# Patient Record
Sex: FEMALE | Race: WHITE | NOT HISPANIC OR LATINO | URBAN - METROPOLITAN AREA
[De-identification: names, ages, dates, MRNs, and addresses within clinical notes are randomized per-mention and may not be internally consistent; named-entity substitution may affect disease eponyms.]

---

## 2018-05-17 DIAGNOSIS — Z85.3 HISTORY OF LEFT BREAST CANCER: Primary | ICD-10-CM

## 2018-05-22 ENCOUNTER — HOSPITAL ENCOUNTER (OUTPATIENT)
Dept: RADIOLOGY | Facility: HOSPITAL | Age: 63
Discharge: HOME | End: 2018-05-22
Attending: SURGERY
Payer: COMMERCIAL

## 2018-05-22 DIAGNOSIS — Z85.3 HISTORY OF LEFT BREAST CANCER: ICD-10-CM

## 2018-05-22 PROCEDURE — 77066 DX MAMMO INCL CAD BI: CPT

## 2018-08-22 ENCOUNTER — OFFICE VISIT (OUTPATIENT)
Dept: GYNECOLOGY | Facility: CLINIC | Age: 63
End: 2018-08-22
Payer: COMMERCIAL

## 2018-08-22 VITALS — WEIGHT: 239 LBS

## 2018-08-22 DIAGNOSIS — Z01.419 ENCOUNTER FOR GYNECOLOGICAL EXAMINATION WITHOUT ABNORMAL FINDING: Primary | ICD-10-CM

## 2018-08-22 DIAGNOSIS — Z85.3 HISTORY OF BREAST CANCER: ICD-10-CM

## 2018-08-22 DIAGNOSIS — N95.0 POSTMENOPAUSAL BLEEDING: ICD-10-CM

## 2018-08-22 PROBLEM — I26.99 PULMONARY EMBOLISM (CMS/HCC): Status: ACTIVE | Noted: 2017-05-18

## 2018-08-22 PROCEDURE — 87624 HPV HI-RISK TYP POOLED RSLT: CPT | Performed by: OBSTETRICS & GYNECOLOGY

## 2018-08-22 PROCEDURE — 88175 CYTOPATH C/V AUTO FLUID REDO: CPT | Performed by: OBSTETRICS & GYNECOLOGY

## 2018-08-22 PROCEDURE — S0612 ANNUAL GYNECOLOGICAL EXAMINA: HCPCS | Performed by: OBSTETRICS & GYNECOLOGY

## 2018-08-22 RX ORDER — LISINOPRIL 10 MG/1
10 TABLET ORAL EVERY MORNING
COMMUNITY
End: 2019-07-16

## 2018-08-22 NOTE — PROGRESS NOTES
"Chief Complaint:  Annual  Here for annual and problem. Last seen 12/15. S/p breast ca x 2. S/p PE from Tamoxifen now on Xarelto, followed by Dr Cowan. Genetic testing negative.   Needs colonoscopy. Sexually active, one male partner, no issues, using lubricant. Pap neg/neg . Hx of abn paps in past, rpeats all nl.    Sent by PCP for recent vaginal bleeding x one episode \" happened after strenuous coughing\" No prior episodes. Now resolved. No pain. Had been on tamoxifen total of 18 mos before d/c in 3/17    HPI:  2018      Jocelin Chopra is a 63 y.o. female who presents for annual exam. The patient has no complaints today. The patient is sexually active. GYN screening history: last pap: was normal. The patient is not taking hormone replacement therapy. Patient reports post-menopausal vaginal bleeding.. The patient participates in regular exercise: no.  The patient reports that there is not domestic violence in her life.    History of abnormal Pap smear: yes - see above  Family history of uterine or ovarian cancer: no  History of abnormal mammogram: yes - breast ca  Family history of breast cancer: no    OB History: Menstrual History:  OB History      Para Term  AB Living    5 2 2   3 2    SAB TAB Ectopic Multiple Live Births    2       2         No LMP recorded.         Medical History:   Past Medical History:   Diagnosis Date   • Breast cancer (CMS/HCC) (HCC)      x 2, had lumpectomy and internal radiation, then arimidex couldn't tolerate five yrs later had second primary same breast ( rt)  had lumpectomy, radiation, tamoxifen   • Herpes    • History of pulmonary embolus (PE) 2017    now on xarelto, thought to be due to tamoxifen   • Hypertension        Surgical History:   Past Surgical History:   Procedure Laterality Date   • BREAST LUMPECTOMY      x2   • WRIST FRACTURE SURGERY         Social History:   Social History     Social History   • Marital status:      Spouse name: N/A   • " Number of children: N/A   • Years of education: N/A     Social History Main Topics   • Smoking status: Never Smoker   • Smokeless tobacco: Never Used   • Alcohol use Yes   • Drug use: Unknown   • Sexual activity: Not Asked     Other Topics Concern   • None     Social History Narrative   • None       Family History:   Family History   Problem Relation Age of Onset   • Pulmonary embolism Paternal Grandmother    • Diabetes Maternal Grandmother    • Colon cancer Father    • Stroke Mother    • Raynaud syndrome Sister        Current Medications:   Current Outpatient Prescriptions   Medication Sig Dispense Refill   • lisinopril (PRINIVIL) 10 mg tablet Take 10 mg by mouth daily.     • rivaroxaban (XARELTO) 10 mg tablet Take 1 tablet by mouth daily.       No current facility-administered medications for this visit.        Allergies: Codeine and Amoxicillin    Review of Systems  Constitutional: negative  Gastrointestinal: negative for abdominal pain  Genitourinary:negative for urinary incontinence  Reproductive:she complains of see hpi  Integument/breast: negative for breast lump, breast tenderness and nipple discharge  Musculoskeletal:negative  Neurological: negative  Behavioral/Psych: negative for anxiety and depression  Endocrine: negative    Physical Exam  Wt 108 kg (239 lb)      General Appearance: Alert, cooperative, no acute distress, obese  Head: Normocephalic, without obvious abnormality, atraumatic    Neck: no adenopathy  Thyroid: no enlargement/tenderness/nodules  Lungs: Clear to auscultation bilaterally, respirations unlabored  Heart: Regular rate and rhythm, S1 and S2 normal, no murmur, rub or gallop  Breast: radiationn changes and scars rt, no masses, nontender and no discharge  Abdomen: Soft, nontender, nondistended,   no masses, no organomegaly  Back: kyphosisNo   Pelvic:     Vulva normal  Vagina normal mucosa  Cervixmultiparous appearance  Uterusnormal size, anteverted  Adnexa: can't assess secondary to  habitus  Rectal: mass No   Extremities: wnl  Musculoskeletal:wnl  Skin: Skin color, texture, turgor normal, no rashes or lesions  Lymph nodes: supraclavicular and axillary nodes normal  Neurologic:oriented and  memory intact  Psych:normal affect and behavior appropriate    Assessment & Plan    Postmenopausal bleeding, pap w/hpv done, needs endo bx and US. Has risk of endo ca because of tamoxifen use.    Dexa age 64    Return in about 2 weeks (around 9/5/2018) for endo bx.  Bailey Silver MD

## 2018-08-29 LAB
CASE RPRT: NORMAL
CLINICAL INFO: NORMAL
CLINICAL INFO: NORMAL
HPV HR 12 DNA CVX QL NAA+PROBE: NEGATIVE
HPV16 DNA SPEC QL NAA+PROBE: NEGATIVE
HPV18 DNA SPEC QL NAA+PROBE: NEGATIVE
SPECIMEN PROCESSING COMMENT: NORMAL
THIN PREP CVX: NORMAL

## 2018-09-01 ENCOUNTER — HOSPITAL ENCOUNTER (OUTPATIENT)
Dept: RADIOLOGY | Age: 63
Discharge: HOME | End: 2018-09-01
Attending: OBSTETRICS & GYNECOLOGY
Payer: COMMERCIAL

## 2018-09-01 DIAGNOSIS — N95.0 POSTMENOPAUSAL BLEEDING: ICD-10-CM

## 2018-09-01 PROCEDURE — 76830 TRANSVAGINAL US NON-OB: CPT

## 2018-09-05 ENCOUNTER — TELEPHONE (OUTPATIENT)
Dept: GYNECOLOGY | Facility: CLINIC | Age: 63
End: 2018-09-05

## 2018-09-07 ENCOUNTER — TELEPHONE (OUTPATIENT)
Dept: GYNECOLOGY | Facility: CLINIC | Age: 63
End: 2018-09-07

## 2018-09-07 NOTE — TELEPHONE ENCOUNTER
PT HAVING ENDO BIOPSY ON TUESDAY, DOES NOT UNDERSTAND THE INSTRUCTION SHEET SHE WAS GIVEN.  PLS CALL [phone]

## 2018-09-07 NOTE — TELEPHONE ENCOUNTER
Needs d/c and hysteroscopy. Stripe 20 mm.  Will need to stop xarelto. Pt to talk to her pcp.  Reviewed pap results as well, nl.

## 2018-09-24 ENCOUNTER — TELEPHONE (OUTPATIENT)
Dept: GYNECOLOGY | Facility: CLINIC | Age: 63
End: 2018-09-24

## 2018-09-24 NOTE — TELEPHONE ENCOUNTER
Has stripe 9 - 20 mm. Don't think biopsy would be sufficient.  Will be done under MAC    Still needs med clearnce

## 2018-09-24 NOTE — TELEPHONE ENCOUNTER
Pt called would like a call back to discuss her possible having a D&C w/o bx Pt says she have some concerns Pt can be reached at [phone]

## 2018-10-01 ENCOUNTER — TELEPHONE (OUTPATIENT)
Dept: GYNECOLOGY | Facility: CLINIC | Age: 63
End: 2018-10-01

## 2018-10-25 ENCOUNTER — PREP FOR CASE (OUTPATIENT)
Dept: GYNECOLOGY | Facility: CLINIC | Age: 63
End: 2018-10-25

## 2018-10-25 DIAGNOSIS — R93.89 ENDOMETRIAL THICKENING ON ULTRASOUND: ICD-10-CM

## 2018-10-25 DIAGNOSIS — N95.0 POSTMENOPAUSAL BLEEDING: Primary | ICD-10-CM

## 2018-10-25 RX ORDER — SODIUM CHLORIDE 9 MG/ML
INJECTION, SOLUTION INTRAVENOUS CONTINUOUS
Status: CANCELLED | OUTPATIENT
Start: 2018-11-08 | End: 2018-11-09

## 2018-10-26 ENCOUNTER — APPOINTMENT (OUTPATIENT)
Dept: PREADMISSION TESTING | Facility: HOSPITAL | Age: 63
End: 2018-10-26
Attending: OBSTETRICS & GYNECOLOGY
Payer: COMMERCIAL

## 2018-10-26 VITALS
HEART RATE: 89 BPM | WEIGHT: 240.1 LBS | OXYGEN SATURATION: 98 % | TEMPERATURE: 98.8 F | BODY MASS INDEX: 40 KG/M2 | RESPIRATION RATE: 16 BRPM | HEIGHT: 65 IN | DIASTOLIC BLOOD PRESSURE: 85 MMHG | SYSTOLIC BLOOD PRESSURE: 162 MMHG

## 2018-10-26 DIAGNOSIS — Z01.818 ENCOUNTER FOR PREADMISSION TESTING: Primary | ICD-10-CM

## 2018-10-26 DIAGNOSIS — N95.0 POSTMENOPAUSAL BLEEDING: ICD-10-CM

## 2018-10-26 DIAGNOSIS — R93.89 ENDOMETRIAL THICKENING ON ULTRASOUND: ICD-10-CM

## 2018-10-26 LAB
ABO + RH BLD: NORMAL
ALBUMIN SERPL-MCNC: 3.8 G/DL (ref 3.4–5)
ALP SERPL-CCNC: 95 IU/L (ref 35–126)
ALT SERPL-CCNC: 25 IU/L (ref 11–54)
ANION GAP SERPL CALC-SCNC: 9 MEQ/L (ref 3–15)
APTT PPP: 35 SEC (ref 23–35)
AST SERPL-CCNC: 18 IU/L (ref 15–41)
ATRIAL RATE: 80
BASOPHILS # BLD: 0.04 K/UL (ref 0.01–0.1)
BASOPHILS NFR BLD: 0.7 %
BILIRUB SERPL-MCNC: 0.6 MG/DL (ref 0.3–1.2)
BLD GP AB SCN SERPL QL: NEGATIVE
BLOOD BANK CMNT PATIENT-IMP: NORMAL
BUN SERPL-MCNC: 12 MG/DL (ref 8–20)
CALCIUM SERPL-MCNC: 9.2 MG/DL (ref 8.9–10.3)
CHLORIDE SERPL-SCNC: 105 MEQ/L (ref 98–109)
CO2 SERPL-SCNC: 25 MEQ/L (ref 22–32)
CREAT SERPL-MCNC: 0.8 MG/DL (ref 0.6–1.1)
D AG BLD QL: POSITIVE
DIFFERENTIAL METHOD BLD: NORMAL
EOSINOPHIL # BLD: 0.13 K/UL (ref 0.04–0.36)
EOSINOPHIL NFR BLD: 2.1 %
ERYTHROCYTE [DISTWIDTH] IN BLOOD BY AUTOMATED COUNT: 12.8 % (ref 11.7–14.4)
GFR SERPL CREATININE-BSD FRML MDRD: >60 ML/MIN/1.73M*2
GLUCOSE SERPL-MCNC: 89 MG/DL (ref 70–99)
HCT VFR BLDCO AUTO: 45.2 % (ref 35–45)
HGB BLD-MCNC: 14.4 G/DL (ref 11.8–15.7)
IMM GRANULOCYTES # BLD AUTO: 0.02 K/UL (ref 0–0.08)
IMM GRANULOCYTES NFR BLD AUTO: 0.3 %
INR PPP: 1.5 INR
LABORATORY COMMENT REPORT: NORMAL
LYMPHOCYTES # BLD: 2.09 K/UL (ref 1.2–3.5)
LYMPHOCYTES NFR BLD: 34.4 %
MCH RBC QN AUTO: 29.3 PG (ref 28–33.2)
MCHC RBC AUTO-ENTMCNC: 31.9 G/DL (ref 32.2–35.5)
MCV RBC AUTO: 92.1 FL (ref 83–98)
MONOCYTES # BLD: 0.43 K/UL (ref 0.28–0.8)
MONOCYTES NFR BLD: 7.1 %
NEUTROPHILS # BLD: 3.36 K/UL (ref 1.7–7)
NEUTS SEG NFR BLD: 55.4 %
NRBC BLD-RTO: 0 %
P AXIS: 63
PDW BLD AUTO: 11.2 FL (ref 9.4–12.3)
PLATELET # BLD AUTO: 241 K/UL (ref 150–369)
POTASSIUM SERPL-SCNC: 4.9 MEQ/L (ref 3.6–5.1)
PR INTERVAL: 164
PROT SERPL-MCNC: 6.4 G/DL (ref 6–8.2)
PROTHROMBIN TIME: 18.6 SEC (ref 12.2–14.5)
QRS DURATION: 82
QT INTERVAL: 368
QTC CALCULATION(BAZETT): 424
R AXIS: 31
RBC # BLD AUTO: 4.91 M/UL (ref 3.93–5.22)
SODIUM SERPL-SCNC: 139 MEQ/L (ref 136–144)
T WAVE AXIS: 28
VENTRICULAR RATE: 80
WBC # BLD AUTO: 6.07 K/UL (ref 3.8–10.5)

## 2018-10-26 PROCEDURE — 93010 ELECTROCARDIOGRAM REPORT: CPT | Performed by: INTERNAL MEDICINE

## 2018-10-26 PROCEDURE — 85610 PROTHROMBIN TIME: CPT

## 2018-10-26 PROCEDURE — 80053 COMPREHEN METABOLIC PANEL: CPT

## 2018-10-26 PROCEDURE — 93005 ELECTROCARDIOGRAM TRACING: CPT

## 2018-10-26 PROCEDURE — 36415 COLL VENOUS BLD VENIPUNCTURE: CPT

## 2018-10-26 PROCEDURE — 86901 BLOOD TYPING SEROLOGIC RH(D): CPT

## 2018-10-26 PROCEDURE — 85025 COMPLETE CBC W/AUTO DIFF WBC: CPT

## 2018-10-26 PROCEDURE — 85730 THROMBOPLASTIN TIME PARTIAL: CPT

## 2018-10-26 ASSESSMENT — PAIN SCALES - GENERAL: PAINLEVEL: 0-NO PAIN

## 2018-10-26 NOTE — PRE-PROCEDURE INSTRUCTIONS
1. We will call you between 3 pm and 7 pm on November 7, 2018 to determine that arrival time for your procedure. If you do not hear by 6PM. Please call [phone] for arrival time.    2. Please report to [facility] [place], walk into [facility] and report to the admission desk on first floor on the day of your procedure.   3. Please follow the following fasting guidelines:   Nothing to eat or drink after midnight unless otherwise instructed by  your physician.    4. Early on the morning of the procedure please take your usual dose of the listed medications with a sip of water:    none   5. Other Instructions:  Bring list of medications, follow all instructions per md     6. If you develop a cold, cough, fever, rash, or other symptom prior to the data of the procedure, please report it to your physician immediately.   7. If you need to cancel the procedure for any reason, please contact your physician or call the unit listed above.   8. Make arrangements to have someone drive you home from the procedure. If you have not arranged for transportation home, your surgery may be cancelled.    9. You may not take public transportation unless accompanied by a responsible person.   10. You may not drive a car or operate complex or potentially dangerous machinery for 24 hours following anesthesia and/or sedation.   11. If it is medically necessary for you to have a longer stay, you will be informed as soon as the decision is made.   12. Do not wear or being anything of value to the hospital including jewelry of any kind. Do not wear make-up or contact lenses. DO bring your glasses and hearing aid.   13. No lotion, creams, powders, or oils on skin the morning of procedure    14. Dress in comfortable clothes.   15.  If instructed, please bring a copy of your Advanced Directive (Living Will/Durable Power of ) on the day of your procedure.      Pre operative instructions given as per protocol.  Form explained by:  Sarah Maynard RN     I have read and understand the above information. I have had sufficient opportunity to ask questions I might have and they have been answered to my satisfaction. I agree to comply with the Patient Responsibilities listed above and have received a copy of this form.

## 2018-11-05 ENCOUNTER — OFFICE VISIT (OUTPATIENT)
Dept: GYNECOLOGY | Facility: CLINIC | Age: 63
End: 2018-11-05
Payer: COMMERCIAL

## 2018-11-05 VITALS
SYSTOLIC BLOOD PRESSURE: 132 MMHG | HEIGHT: 65 IN | WEIGHT: 241.2 LBS | DIASTOLIC BLOOD PRESSURE: 90 MMHG | BODY MASS INDEX: 40.19 KG/M2

## 2018-11-05 DIAGNOSIS — R93.89 ENDOMETRIAL THICKENING ON ULTRASOUND: ICD-10-CM

## 2018-11-05 DIAGNOSIS — N95.0 POSTMENOPAUSAL BLEEDING: Primary | ICD-10-CM

## 2018-11-05 PROBLEM — I10 HTN (HYPERTENSION): Status: ACTIVE | Noted: 2018-10-15

## 2018-11-05 PROCEDURE — 99214 OFFICE O/P EST MOD 30 MIN: CPT | Performed by: OBSTETRICS & GYNECOLOGY

## 2018-11-05 RX ORDER — MISOPROSTOL 200 UG/1
TABLET ORAL
Qty: 4 TABLET | Refills: 0 | Status: SHIPPED | OUTPATIENT
Start: 2018-11-05 | End: 2018-11-08 | Stop reason: HOSPADM

## 2018-11-05 NOTE — PROGRESS NOTES
Pre op visit:      CC:    Postmenopausal bleeding with thickened stripe. Of 9 mm but up to 20 mm. HX of tamoxifen. No current bleeding. Bleeding occurred x 1 in August For d&c hysteroscopy with truclear. Pt has read hysteroscopy literature.    Saw her pcp, has hx of PE, told by PCP to d/c xarelto today and wait until she has no post op bleeding.    HPI:    Has above. Pt had second opinion who concurs. US done there also with abn stripe.    Med history:   Past Medical History:   Diagnosis Date   • Breast cancer (CMS/HCC) (HCC)      x 2, had lumpectomy and internal radiation, then arimidex couldn't tolerate five yrs later had second primary same breast ( rt)  had lumpectomy, radiation, tamoxifen   • Herpes    • History of pulmonary embolus (PE) 2017    now on xarelto, thought to be due to tamoxifen   • Hypertension    • PONV (postoperative nausea and vomiting)              Surg hx:   Past Surgical History:   Procedure Laterality Date   • BREAST LUMPECTOMY      x2   • COLONOSCOPY     • WRIST FRACTURE SURGERY           ROS:    Reproductive:  Postmenopausal bleeding    Gi: neg    Gu: neg      Physical;    Constitutional: obese    COR RRR    RESP: clear  To auscultation bilaterally    Pelvic: atrophic cervix, uterus ant nl size  Adnexa not palpable secondary to wgt    Neuro; aaox3    Psych: appropriate mood and affect.    Assessment/Plan:  Problem List Items Addressed This Visit     Postmenopausal bleeding - Primary    Overview     For d&c hysteroscopy with Truclear for thickened stripe         Endometrial thickening on ultrasound    Overview     Added automatically from request for surgery [id number]  Stripe up to 20 mm.           Consent and procedure reviewed including risks of bleeding, infection, failed procedure, perforation of uterus bladder or bowel.  Pt to use misoprostol vaginally pre op.  Greater than 50% of the visit was spent in face to face discussion.    Return in about 3 weeks (around 11/26/2018) for post  op.      Bailey Silver MD

## 2018-11-06 ENCOUNTER — TELEPHONE (OUTPATIENT)
Dept: GYNECOLOGY | Facility: CLINIC | Age: 63
End: 2018-11-06

## 2018-11-08 ENCOUNTER — ANESTHESIA EVENT (OUTPATIENT)
Dept: SURGERY | Facility: HOSPITAL | Age: 63
Setting detail: HOSPITAL OUTPATIENT SURGERY
End: 2018-11-08
Payer: COMMERCIAL

## 2018-11-08 ENCOUNTER — HOSPITAL ENCOUNTER (OUTPATIENT)
Facility: HOSPITAL | Age: 63
Setting detail: HOSPITAL OUTPATIENT SURGERY
Discharge: HOME | End: 2018-11-08
Attending: OBSTETRICS & GYNECOLOGY | Admitting: OBSTETRICS & GYNECOLOGY
Payer: COMMERCIAL

## 2018-11-08 VITALS
OXYGEN SATURATION: 100 % | SYSTOLIC BLOOD PRESSURE: 132 MMHG | TEMPERATURE: 98.5 F | WEIGHT: 237 LBS | HEIGHT: 65 IN | DIASTOLIC BLOOD PRESSURE: 63 MMHG | BODY MASS INDEX: 39.49 KG/M2 | HEART RATE: 85 BPM | RESPIRATION RATE: 20 BRPM

## 2018-11-08 DIAGNOSIS — R93.89 ENDOMETRIAL THICKENING ON ULTRASOUND: ICD-10-CM

## 2018-11-08 DIAGNOSIS — N95.0 POSTMENOPAUSAL BLEEDING: ICD-10-CM

## 2018-11-08 PROCEDURE — 58558 HYSTEROSCOPY BIOPSY: CPT | Performed by: OBSTETRICS & GYNECOLOGY

## 2018-11-08 PROCEDURE — 25800000 HC PHARMACY IV SOLUTIONS: Performed by: OBSTETRICS & GYNECOLOGY

## 2018-11-08 PROCEDURE — 71000011 HC PACU PHASE 1 EA ADDL MIN: Performed by: OBSTETRICS & GYNECOLOGY

## 2018-11-08 PROCEDURE — 63600000 HC DRUGS/DETAIL CODE: Performed by: NURSE ANESTHETIST, CERTIFIED REGISTERED

## 2018-11-08 PROCEDURE — 36000002 HC OR LEVEL 2 INITIAL 30MIN: Performed by: OBSTETRICS & GYNECOLOGY

## 2018-11-08 PROCEDURE — 0UDB7ZX EXTRACTION OF ENDOMETRIUM, VIA NATURAL OR ARTIFICIAL OPENING, DIAGNOSTIC: ICD-10-PCS | Performed by: OBSTETRICS & GYNECOLOGY

## 2018-11-08 PROCEDURE — 36000012 HC OR LEVEL 2 EA ADDL MIN: Performed by: OBSTETRICS & GYNECOLOGY

## 2018-11-08 PROCEDURE — 88305 TISSUE EXAM BY PATHOLOGIST: CPT | Performed by: OBSTETRICS & GYNECOLOGY

## 2018-11-08 PROCEDURE — 71000001 HC PACU PHASE 1 INITIAL 30MIN: Performed by: OBSTETRICS & GYNECOLOGY

## 2018-11-08 PROCEDURE — 27200000 HC STERILE SUPPLY: Performed by: OBSTETRICS & GYNECOLOGY

## 2018-11-08 PROCEDURE — 71000002 HC PACU PHASE 2 INITIAL 30MIN: Performed by: OBSTETRICS & GYNECOLOGY

## 2018-11-08 PROCEDURE — 25000000 HC PHARMACY GENERAL: Performed by: OBSTETRICS & GYNECOLOGY

## 2018-11-08 PROCEDURE — 0UB98ZX EXCISION OF UTERUS, VIA NATURAL OR ARTIFICIAL OPENING ENDOSCOPIC, DIAGNOSTIC: ICD-10-PCS | Performed by: OBSTETRICS & GYNECOLOGY

## 2018-11-08 PROCEDURE — 25000000 HC PHARMACY GENERAL: Performed by: NURSE ANESTHETIST, CERTIFIED REGISTERED

## 2018-11-08 PROCEDURE — 37000002 HC ANESTHESIA MAC: Performed by: OBSTETRICS & GYNECOLOGY

## 2018-11-08 PROCEDURE — 71000012 HC PACU PHASE 2 EA ADDL MIN: Performed by: OBSTETRICS & GYNECOLOGY

## 2018-11-08 RX ORDER — FENTANYL CITRATE 50 UG/ML
50 INJECTION, SOLUTION INTRAMUSCULAR; INTRAVENOUS
Status: DISCONTINUED | OUTPATIENT
Start: 2018-11-08 | End: 2018-11-08 | Stop reason: HOSPADM

## 2018-11-08 RX ORDER — IBUPROFEN 200 MG
16-32 TABLET ORAL AS NEEDED
Status: DISCONTINUED | OUTPATIENT
Start: 2018-11-08 | End: 2018-11-08 | Stop reason: SDUPTHER

## 2018-11-08 RX ORDER — DEXTROSE 50 % IN WATER (D50W) INTRAVENOUS SYRINGE
25 AS NEEDED
Status: DISCONTINUED | OUTPATIENT
Start: 2018-11-08 | End: 2018-11-08 | Stop reason: HOSPADM

## 2018-11-08 RX ORDER — FENTANYL CITRATE 50 UG/ML
INJECTION, SOLUTION INTRAMUSCULAR; INTRAVENOUS AS NEEDED
Status: DISCONTINUED | OUTPATIENT
Start: 2018-11-08 | End: 2018-11-08 | Stop reason: SURG

## 2018-11-08 RX ORDER — ONDANSETRON 4 MG/1
4 TABLET, ORALLY DISINTEGRATING ORAL EVERY 8 HOURS PRN
Status: DISCONTINUED | OUTPATIENT
Start: 2018-11-08 | End: 2018-11-08 | Stop reason: HOSPADM

## 2018-11-08 RX ORDER — MIDAZOLAM HYDROCHLORIDE 2 MG/2ML
INJECTION, SOLUTION INTRAMUSCULAR; INTRAVENOUS AS NEEDED
Status: DISCONTINUED | OUTPATIENT
Start: 2018-11-08 | End: 2018-11-08 | Stop reason: SURG

## 2018-11-08 RX ORDER — SODIUM CHLORIDE 9 MG/ML
INJECTION, SOLUTION INTRAVENOUS CONTINUOUS
Status: DISCONTINUED | OUTPATIENT
Start: 2018-11-08 | End: 2018-11-08 | Stop reason: HOSPADM

## 2018-11-08 RX ORDER — DEXTROSE 40 %
15-30 GEL (GRAM) ORAL AS NEEDED
Status: DISCONTINUED | OUTPATIENT
Start: 2018-11-08 | End: 2018-11-08 | Stop reason: SDUPTHER

## 2018-11-08 RX ORDER — DEXTROSE 50 % IN WATER (D50W) INTRAVENOUS SYRINGE
25 AS NEEDED
Status: DISCONTINUED | OUTPATIENT
Start: 2018-11-08 | End: 2018-11-08 | Stop reason: SDUPTHER

## 2018-11-08 RX ORDER — DEXTROSE 40 %
15-30 GEL (GRAM) ORAL AS NEEDED
Status: DISCONTINUED | OUTPATIENT
Start: 2018-11-08 | End: 2018-11-08 | Stop reason: HOSPADM

## 2018-11-08 RX ORDER — DEXAMETHASONE SODIUM PHOSPHATE 4 MG/ML
INJECTION, SOLUTION INTRA-ARTICULAR; INTRALESIONAL; INTRAMUSCULAR; INTRAVENOUS; SOFT TISSUE AS NEEDED
Status: DISCONTINUED | OUTPATIENT
Start: 2018-11-08 | End: 2018-11-08 | Stop reason: SURG

## 2018-11-08 RX ORDER — ACETAMINOPHEN 325 MG/1
650 TABLET ORAL EVERY 4 HOURS PRN
Status: DISCONTINUED | OUTPATIENT
Start: 2018-11-08 | End: 2018-11-08 | Stop reason: HOSPADM

## 2018-11-08 RX ORDER — PROPOFOL 200MG/20ML
SYRINGE (ML) INTRAVENOUS AS NEEDED
Status: DISCONTINUED | OUTPATIENT
Start: 2018-11-08 | End: 2018-11-08 | Stop reason: SURG

## 2018-11-08 RX ORDER — LIDOCAINE HYDROCHLORIDE AND EPINEPHRINE 10; 10 UG/ML; MG/ML
INJECTION, SOLUTION INFILTRATION; PERINEURAL AS NEEDED
Status: DISCONTINUED | OUTPATIENT
Start: 2018-11-08 | End: 2018-11-08 | Stop reason: HOSPADM

## 2018-11-08 RX ORDER — IBUPROFEN 200 MG
16-32 TABLET ORAL AS NEEDED
Status: DISCONTINUED | OUTPATIENT
Start: 2018-11-08 | End: 2018-11-08 | Stop reason: HOSPADM

## 2018-11-08 RX ORDER — LIDOCAINE HYDROCHLORIDE 10 MG/ML
INJECTION, SOLUTION EPIDURAL; INFILTRATION; INTRACAUDAL; PERINEURAL AS NEEDED
Status: DISCONTINUED | OUTPATIENT
Start: 2018-11-08 | End: 2018-11-08 | Stop reason: SURG

## 2018-11-08 RX ORDER — KETOROLAC TROMETHAMINE 30 MG/ML
INJECTION, SOLUTION INTRAMUSCULAR; INTRAVENOUS AS NEEDED
Status: DISCONTINUED | OUTPATIENT
Start: 2018-11-08 | End: 2018-11-08 | Stop reason: SURG

## 2018-11-08 RX ORDER — ONDANSETRON HYDROCHLORIDE 2 MG/ML
4 INJECTION, SOLUTION INTRAVENOUS
Status: DISCONTINUED | OUTPATIENT
Start: 2018-11-08 | End: 2018-11-08 | Stop reason: HOSPADM

## 2018-11-08 RX ORDER — ONDANSETRON HYDROCHLORIDE 2 MG/ML
INJECTION, SOLUTION INTRAVENOUS AS NEEDED
Status: DISCONTINUED | OUTPATIENT
Start: 2018-11-08 | End: 2018-11-08 | Stop reason: SURG

## 2018-11-08 RX ADMIN — SODIUM CHLORIDE: 9 INJECTION, SOLUTION INTRAVENOUS at 12:17

## 2018-11-08 RX ADMIN — PROPOFOL 150 MG: 10 INJECTION, EMULSION INTRAVENOUS at 13:57

## 2018-11-08 RX ADMIN — SODIUM CHLORIDE: 9 INJECTION, SOLUTION INTRAVENOUS at 13:52

## 2018-11-08 RX ADMIN — ONDANSETRON 4 MG: 2 INJECTION INTRAMUSCULAR; INTRAVENOUS at 14:07

## 2018-11-08 RX ADMIN — FENTANYL CITRATE 100 MCG: 50 INJECTION, SOLUTION INTRAMUSCULAR; INTRAVENOUS at 13:57

## 2018-11-08 RX ADMIN — LIDOCAINE HYDROCHLORIDE 5 ML: 10 INJECTION, SOLUTION EPIDURAL; INFILTRATION; INTRACAUDAL; PERINEURAL at 13:57

## 2018-11-08 RX ADMIN — DEXAMETHASONE SODIUM PHOSPHATE 4 MG: 4 INJECTION, SOLUTION INTRA-ARTICULAR; INTRALESIONAL; INTRAMUSCULAR; INTRAVENOUS; SOFT TISSUE at 14:07

## 2018-11-08 RX ADMIN — MIDAZOLAM HYDROCHLORIDE 2 MG: 1 INJECTION, SOLUTION INTRAMUSCULAR; INTRAVENOUS at 13:57

## 2018-11-08 RX ADMIN — FENTANYL CITRATE 50 MCG: 50 INJECTION, SOLUTION INTRAMUSCULAR; INTRAVENOUS at 14:29

## 2018-11-08 RX ADMIN — FENTANYL CITRATE 50 MCG: 50 INJECTION, SOLUTION INTRAMUSCULAR; INTRAVENOUS at 14:12

## 2018-11-08 RX ADMIN — KETOROLAC TROMETHAMINE 15 MG: 30 INJECTION, SOLUTION INTRAMUSCULAR at 14:39

## 2018-11-08 ASSESSMENT — PAIN - FUNCTIONAL ASSESSMENT: PAIN_FUNCTIONAL_ASSESSMENT: NO/DENIES PAIN

## 2018-11-08 NOTE — ANESTHESIA PROCEDURE NOTES
Airway  Urgency: elective    Start Time: 11/8/2018 1:59 PM  Stop Time: 11/8/2018 1:59 PM      General Information and Staff    Patient location during procedure: OR  Anesthesiologist: JAKI LIN  Resident/CRNA: KIMBER WATSON    Indications and Patient Condition  Indications for airway management: anesthesia  Sedation level: general  Preoxygenated: yes  MILS maintained throughout  Mask difficulty assessment: 0 - not attempted    Final Airway Details  Final airway type: supraglottic airway (LMA)      Successful airway: iGel  Size 4

## 2018-11-08 NOTE — ANESTHESIA PREPROCEDURE EVALUATION
Anesthesia ROS/MED HX    Anesthesia History    History of anesthetic complications  Pulmonary - neg  Neuro/Psych - neg  Cardiovascular   hypertension  Hematological    coagulopathy (hx of PE and DVT)  GI/Hepatic- neg  Musculoskeletal- neg  Endo/Other- neg    Patient Active Problem List   Diagnosis   • Pulmonary embolism (CMS/HCC) (HCC)   • History of breast cancer   • Postmenopausal bleeding   • Endometrial thickening on ultrasound   • HTN (hypertension)       Past Surgical History:   Procedure Laterality Date   • BREAST LUMPECTOMY      x2   • COLONOSCOPY     • WRIST FRACTURE SURGERY         Current Facility-Administered Medications   Medication Dose Route Frequency   • sodium chloride 0.9 %   intravenous Continuous       Prior to Admission medications    Medication Sig Start Date End Date Taking? Authorizing Provider   lisinopril (PRINIVIL) 10 mg tablet Take 10 mg by mouth every morning.     Yes Sarah Ellison MD   miSOPROStol (CYTOTEC) 200 mcg tablet 2 vaginally 6 hours and then again 3 hours before your appointment 11/5/18  Yes Bailey Silver MD   rivaroxaban (XARELTO) 10 mg tablet Take 1 tablet by mouth every morning. Pt will stop per MD instruction    Yes Sarah Ellison MD       CBC Results       10/26/18 03/06/17 02/03/16                    1156 1659 1032         WBC 6.07 11.11 (H) 7.19         RBC 4.91 4.81 4.47         HGB 14.4 13.8 13.0         HCT 45.2 (H) 41.6 39.8         MCV 92.1 86.5 89.0         MCH 29.3 28.7 29.1         MCHC 31.9 (L) 33.2 32.7          170 215                       BMP Results       10/26/18 03/06/17 02/03/16                    1156 1659 1032          140 140         K 4.9 3.7 4.2         Cl 105 106 104         CO2 25 23 29         Glucose 89 95 88         BUN 12 15 14         Creatinine 0.8 0.8 0.7         Calcium 9.2 9.0 9.0         Anion Gap 9 11 7         EGFR &gt;60.0 - -                       No results found for: HCGPREGUR, PREGSERUM, HCG,  HCGQUANT          No orders to display       Past Surgical History:   Procedure Laterality Date   • BREAST LUMPECTOMY      x2   • COLONOSCOPY     • WRIST FRACTURE SURGERY         Physical Exam    Airway   Mallampati: III   TM distance: >3 FB   Neck ROM: full  Cardiovascular - normal   Rhythm: regular   Rate: normal  Pulmonary - normal   clear to auscultation  Dental - normal        Anesthesia Plan    Plan: MAC    Technique: MAC     not instructed to abstain from smoking on day of procedure    Patient did not smoke on day of surgery  ASA 3  Anesthetic plan and risks discussed with: patient and significant other  Induction:    intravenous   Postop Plan:   Patient Disposition: phase II then home   Pain Management: IV analgesics

## 2018-11-08 NOTE — OP NOTE
Preoperative diagnosis: PMB   Postoperative diagnosis: PMB  Procedure: Dilation and curettage, Hysteroscopy,Truclear resection of a suspected polyp   Surgeon: Bailey Silver M.D.   Assist: Leelee Adam MD PGY1  Anesthesia: LMA and local  EBL: 5  Drains: Straight catheterized, clear urine, 100cc  Specimen: EMC and suspected endometrial polyp   Complications: none  Sponge and needle count: correct x2  Indications for procedure: PMB   Intraoperative findings: small anteverted uterus, fundal polyp prolapsing into the endocervical canal, atropic endometrium   Disposition: stable      PROCEDURE:   Jocelin Chopra presented to [facility] on 11/8/2018. The patient had a history of postmenopausal bleeding and a thickened endometrial strip on US of 9mm up to 20 mm. The patient was consented for a d&C hysteroscopy with truclear resection. The surgeon and patient were mutually identified in the preoperative area. Her consents were reconfirmed and her questions were again answered. She was taken to the operating suite and administered LMA anesthesia. ihe She was placed in the dorsal lithotomy position. A bimanual exam under anesthesia revealed small anteverted uterus. She was prepped and draped in the usual sterile fashion. A timeout was performed.    Her bladder was sterilely drained. A Mcallister speculum and Frank retractor were placed in the patient’s vagina. The anterior lip of the cervix was grasped with a single-tooth tenaculum. A paracervical block was administered with 1% lidocaine with epinephrine.  Her cervix was then serially dilated to19 using Olivera dilators to accommodate a 5mm hysteroscope, which was advanced through the cervix into the uterine fundus and uterine distention was applied. The was noted to be a fundal polyp prolapsing into the endocervical canal filling the majority of the cavity. Next, the truclear device was used to remove the polyp in the usual fashion. There were no signs of injury or uterine  perforation. The endometrium was noted to be atrophic. Bilateral tubal ostia were seen. The hysteroscope was removed.    Gentle sharp curettage was then performed in 360° fashion. This specimen was sent to pathology for permanent evaluation. Bleeding at the cervical os was noted to be minimal. The tenaculum was removed from the cervix and the site was hemostatic after a brief application of allis clamps. All sponges, instruments, needles were removed from the patient’s vagina. All counts were found to be correct. The patient was awoken in the operating room and taken to the PACU awake and in stable condition. Dr Silver actively participated in the entire procedure.

## 2018-11-08 NOTE — DISCHARGE INSTRUCTIONS
Dilation and Curettage   Hysteroscopy with Truclear resection   Discharge Instructions    What to Expect  It is normal to have light bleeding. This can occur immediately after the procedure or in a few days.   It is normal to experience some mild cramping after the procedure but this usually does not last for more than a few days. You may take Acetaminophen (Tylenol) or Ibuprofen (Motrin, Advil) as directed for pain.      Activity   Please do not get in a swimming pool, hot tub or tub bath for at least 2-3 weeks after your procedure.   Please delay sexual intercourse for at least 2-3 weeks.  You may resume normal activities in 1-2 days. Please wait an additional 1-2 days for strenuous exercise.  You may resume driving 24 hours after anesthesia.      PLEASE CALL THE OFFICE IF YOU EXPERIENCE ANY OF THE FOLLOWING:   - Heavy bleeding (using one pad an hour)  - Fever greater than 100.4  - Foul smelling vaginal discharge   - Worsening pain or any other concerns    Please attend your scheduled post operative appointment

## 2018-11-08 NOTE — OR SURGEON
I identified pt in the PACU   I have seen this pt and there are no changes for the indication of the procedure.

## 2018-11-08 NOTE — ANESTHESIA POSTPROCEDURE EVALUATION
Patient: Jocelin Chopra    Procedure Summary     Date:  11/08/18 Room / Location:  [facility]    Anesthesia Start:  1352 Anesthesia Stop:  1452    Procedure:  D&C, Truclear resection of endometrial polyp (N/A ) Diagnosis:       Postmenopausal bleeding      Endometrial thickening on ultrasound      (Postmenopausal bleeding [N95.0])      (Endometrial thickening on ultrasound [R93.89])    Surgeon:  Bailey Silver MD Responsible Provider:  Emily Peñaloza MD    Anesthesia Type:  MAC ASA Status:  3          Anesthesia Type: MAC  PACU Vitals  11/8/2018 1448 - 11/8/2018 1515      11/8/2018 1454             BP: 121/60    Temp: 37.7 °C (99.9 °F)    Pulse: 92    Resp: 16    SpO2: 99 %            Anesthesia Post Evaluation    Pain management: satisfactory to patient  Mode of pain management: IV medication  Patient location during evaluation: PACU  Patient participation: complete - patient participated  Level of consciousness: awake and alert  Cardiovascular status: hemodynamically stable  Airway Patency: patent  Respiratory status: nonlabored ventilation and spontaneous ventilation  Hydration status: stable  Anesthetic complications: no

## 2018-11-12 LAB
CASE RPRT: NORMAL
CLINICAL INFO: NORMAL
PATH REPORT.FINAL DX SPEC: NORMAL
PATH REPORT.GROSS SPEC: NORMAL

## 2018-11-29 ENCOUNTER — OFFICE VISIT (OUTPATIENT)
Dept: GYNECOLOGY | Facility: CLINIC | Age: 63
End: 2018-11-29
Payer: COMMERCIAL

## 2018-11-29 VITALS — DIASTOLIC BLOOD PRESSURE: 86 MMHG | SYSTOLIC BLOOD PRESSURE: 136 MMHG

## 2018-11-29 DIAGNOSIS — R93.89 ENDOMETRIAL THICKENING ON ULTRASOUND: ICD-10-CM

## 2018-11-29 DIAGNOSIS — N95.0 POSTMENOPAUSAL BLEEDING: Primary | ICD-10-CM

## 2018-11-29 PROCEDURE — 99024 POSTOP FOLLOW-UP VISIT: CPT | Performed by: OBSTETRICS & GYNECOLOGY

## 2018-11-29 NOTE — PROGRESS NOTES
Post op visit        Jocelin Chopra        Procedure: d&c hysteroscopy with truclear      Review systems: sm amount of bleeding post op otherwise doing well      /86 :        Exam: wnwd nad      Assessment/plan: nl exam, path benign, reviewed with pt, reviewed op photos  As well      Return in about 1 year (around 11/29/2019).        Bailey Silver MD

## 2019-01-10 ENCOUNTER — TRANSCRIBE ORDERS (OUTPATIENT)
Dept: SCHEDULING | Age: 64
End: 2019-01-10

## 2019-01-10 DIAGNOSIS — R05.9 COUGH: ICD-10-CM

## 2019-01-10 DIAGNOSIS — R06.09 OTHER FORMS OF DYSPNEA: Primary | ICD-10-CM

## 2019-01-10 DIAGNOSIS — C50.919 MALIGNANT NEOPLASM OF FEMALE BREAST (CMS/HCC): ICD-10-CM

## 2019-01-11 ENCOUNTER — HOSPITAL ENCOUNTER (OUTPATIENT)
Dept: PULMONOLOGY | Facility: HOSPITAL | Age: 64
Discharge: HOME | End: 2019-01-11
Attending: INTERNAL MEDICINE
Payer: COMMERCIAL

## 2019-01-11 PROCEDURE — 25000000 HC PHARMACY GENERAL: Performed by: INTERNAL MEDICINE

## 2019-01-11 PROCEDURE — 94060 EVALUATION OF WHEEZING: CPT

## 2019-01-11 PROCEDURE — 94726 PLETHYSMOGRAPHY LUNG VOLUMES: CPT

## 2019-01-11 PROCEDURE — 94729 DIFFUSING CAPACITY: CPT

## 2019-01-11 RX ORDER — ALBUTEROL SULFATE 0.83 MG/ML
2.5 SOLUTION RESPIRATORY (INHALATION) ONCE
Status: COMPLETED | OUTPATIENT
Start: 2019-01-11 | End: 2019-01-11

## 2019-01-11 RX ADMIN — ALBUTEROL SULFATE 2.5 MG: 2.5 SOLUTION RESPIRATORY (INHALATION) at 13:18

## 2019-01-28 ENCOUNTER — TRANSCRIBE ORDERS (OUTPATIENT)
Dept: RADIOLOGY | Age: 64
End: 2019-01-28

## 2019-01-28 ENCOUNTER — HOSPITAL ENCOUNTER (OUTPATIENT)
Dept: RADIOLOGY | Age: 64
Discharge: HOME | End: 2019-01-28
Attending: INTERNAL MEDICINE
Payer: COMMERCIAL

## 2019-01-28 DIAGNOSIS — R06.09 OTHER FORMS OF DYSPNEA: ICD-10-CM

## 2019-01-28 DIAGNOSIS — R05.9 COUGH: ICD-10-CM

## 2019-01-28 DIAGNOSIS — C50.919 MALIGNANT NEOPLASM OF FEMALE BREAST (CMS/HCC): ICD-10-CM

## 2019-01-28 PROCEDURE — 71250 CT THORAX DX C-: CPT

## 2019-01-28 PROCEDURE — 74150 CT ABDOMEN W/O CONTRAST: CPT

## 2019-04-29 ENCOUNTER — TELEPHONE (OUTPATIENT)
Dept: SURGERY | Facility: CLINIC | Age: 64
End: 2019-04-29

## 2019-04-29 DIAGNOSIS — Z85.3 PERSONAL HISTORY OF BREAST CANCER: Primary | ICD-10-CM

## 2019-05-03 ENCOUNTER — TRANSCRIBE ORDERS (OUTPATIENT)
Dept: SCHEDULING | Age: 64
End: 2019-05-03

## 2019-05-03 DIAGNOSIS — I26.99 OTHER PULMONARY EMBOLISM WITHOUT ACUTE COR PULMONALE: ICD-10-CM

## 2019-05-03 DIAGNOSIS — J90 PLEURAL EFFUSION, NOT ELSEWHERE CLASSIFIED: ICD-10-CM

## 2019-05-03 DIAGNOSIS — C50.919 MALIGNANT NEOPLASM OF FEMALE BREAST (CMS/HCC): ICD-10-CM

## 2019-05-03 DIAGNOSIS — R06.09 OTHER FORMS OF DYSPNEA: Primary | ICD-10-CM

## 2019-07-16 ENCOUNTER — HOSPITAL ENCOUNTER (OUTPATIENT)
Dept: RADIOLOGY | Facility: HOSPITAL | Age: 64
Discharge: HOME | End: 2019-07-16
Attending: SURGERY
Payer: COMMERCIAL

## 2019-07-16 ENCOUNTER — OFFICE VISIT (OUTPATIENT)
Dept: SURGERY | Facility: CLINIC | Age: 64
End: 2019-07-16
Payer: COMMERCIAL

## 2019-07-16 VITALS
HEART RATE: 86 BPM | HEIGHT: 65 IN | WEIGHT: 237 LBS | SYSTOLIC BLOOD PRESSURE: 143 MMHG | BODY MASS INDEX: 39.49 KG/M2 | DIASTOLIC BLOOD PRESSURE: 83 MMHG

## 2019-07-16 DIAGNOSIS — Z85.3 PERSONAL HISTORY OF BREAST CANCER: ICD-10-CM

## 2019-07-16 DIAGNOSIS — Z17.0 ER+ (ESTROGEN RECEPTOR POSITIVE STATUS): ICD-10-CM

## 2019-07-16 DIAGNOSIS — Z85.3 HISTORY OF BREAST CANCER: Primary | ICD-10-CM

## 2019-07-16 PROCEDURE — G0279 TOMOSYNTHESIS, MAMMO: HCPCS

## 2019-07-16 PROCEDURE — 99214 OFFICE O/P EST MOD 30 MIN: CPT | Performed by: SURGERY

## 2019-07-16 NOTE — ASSESSMENT & PLAN NOTE
She is now 10 years out from her initial diagnosis of a T1 a N0 M0 carcinoma treated with lumpectomy and partial breast radiation as well as 5 years out from an ipsilateral recurrence also treated with partial mastectomy and radiation.  She has no evidence of recurrent disease on her current mammogram and we will simply see her back in the office in 1 year with a new baseline bilateral mammogram.

## 2019-07-16 NOTE — PROGRESS NOTES
Jocelin comes the office for follow-up given a history of left breast cancer originally diagnosed 10 years ago and a subsequent recurrence in the upper outer portion of the breast diagnosed 5 years ago.  She is doing well and has no complaints.  She declined any further aromatase inhibitor after developing the pulmonary embolism on tamoxifen.  She still remains anticoagulated on Xarelto.  She finally did settle for a new home in [place] and her 's job seems to be secure.  She is still working for [employer].  She had a bilateral mammogram done through the breast imaging center earlier today that was reviewed in the office.  There were no suspicious changes and one-year follow-up was recommended.    There are no changes in her past medical history, past surgical history, medications or allergies.    There are no changes in her family history.    ROS:   Significant for normalities and all systems reviewed.    Physical exam: Well-developed, well-nourished female in no apparent distress.  She is alert and oriented ×3 and her mood and affect are appropriate.  Her HEENT has no cervical or supraclavicular adenopathy.  There are no thyroid masses.  Her breast examination is symmetric.  She does have fairly significant post procedure density in the upper outer quadrant and at the areolar margin but this is consistent with adjacent areas of partial breast radiation.  There appears to be no evidence of recurrence.  There are no dominant masses, skin changes, nipple discharge or axillary adenopathy.  Remedies have full range of motion without lymphedema.        Assessment/Plan :  Problem List Items Addressed This Visit     History of breast cancer - Primary     She is now 10 years out from her initial diagnosis of a T1 a N0 M0 carcinoma treated with lumpectomy and partial breast radiation as well as 5 years out from an ipsilateral recurrence also treated with partial mastectomy and radiation.  She has no evidence of  recurrent disease on her current mammogram and we will simply see her back in the office in 1 year with a new baseline bilateral mammogram.         Relevant Orders    BI DIAGNOSTIC MAMMOGRAM BILATERAL    ER+ (estrogen receptor positive status)     Not on any hormone therapy due to clot on tamoxifen.  She declined an AI.                  Return in about 1 year (around 7/16/2020).    MD Eliza Barba MD

## 2020-02-18 ENCOUNTER — OFFICE VISIT (OUTPATIENT)
Dept: GYNECOLOGY | Facility: CLINIC | Age: 65
End: 2020-02-18
Payer: COMMERCIAL

## 2020-02-18 VITALS
DIASTOLIC BLOOD PRESSURE: 70 MMHG | WEIGHT: 252 LBS | BODY MASS INDEX: 41.99 KG/M2 | HEIGHT: 65 IN | SYSTOLIC BLOOD PRESSURE: 140 MMHG

## 2020-02-18 DIAGNOSIS — Z13.820 OSTEOPOROSIS SCREENING: ICD-10-CM

## 2020-02-18 DIAGNOSIS — Z01.419 ENCOUNTER FOR GYNECOLOGICAL EXAMINATION: Primary | ICD-10-CM

## 2020-02-18 PROBLEM — R93.89 ENDOMETRIAL THICKENING ON ULTRASOUND: Status: RESOLVED | Noted: 2018-10-25 | Resolved: 2020-02-18

## 2020-02-18 PROCEDURE — 99397 PER PM REEVAL EST PAT 65+ YR: CPT | Performed by: OBSTETRICS & GYNECOLOGY

## 2020-02-18 RX ORDER — VALSARTAN 80 MG/1
80 TABLET ORAL DAILY
COMMUNITY
Start: 2020-01-25

## 2020-02-18 NOTE — PROGRESS NOTES
Chief Complaint:  Annual    Has moved to [facility],    needs new gyn  No bleeding since hysteroscopic removal of polyp.   pap neg/neg 2018 hx of breast cancer sees DR Cowan. Has had genetic testing.    Due for colonoscopy.   Unclear if she has had a DEXA    No sexual issues, same male partner.    HPI:  2020      Jocelin Chopra is a 65 y.o. female who presents for annual exam. The patient has no complaints today. The patient is sexually active. GYN screening history: last pap: was normal. The patient is not taking hormone replacement therapy. Patient denies post-menopausal vaginal bleeding.. The patient participates in regular exercise: yes.  The patient reports that there is not domestic violence in her life.    History of abnormal Pap smear: no  Family history of uterine or ovarian cancer: no  History of abnormal mammogram: yes - breast ca x 2  Family history of breast cancer: no    OB History: Menstrual History:  OB History        5    Para   2    Term   2            AB   3    Living   2       SAB   2    TAB        Ectopic        Multiple        Live Births   2           Obstetric Comments   No Hbp, No Gdm            No LMP recorded. Patient is postmenopausal.         Medical History:   Past Medical History:   Diagnosis Date   • Breast cancer (CMS/HCC)      x 2, had lumpectomy and internal radiation, then arimidex couldn't tolerate five yrs later had second primary same breast ( rt)  had lumpectomy, radiation, tamoxifen   • Herpes    • History of pulmonary embolus (PE) 2017    now on xarelto, thought to be due to tamoxifen   • Hypertension    • PONV (postoperative nausea and vomiting)        Surgical History:   Past Surgical History:   Procedure Laterality Date   • BREAST LUMPECTOMY      x2   • COLONOSCOPY     • WRIST FRACTURE SURGERY         Social History:   Social History     Socioeconomic History   • Marital status:      Spouse name: None   • Number of children: None   • Years of  education: None   • Highest education level: None   Occupational History   • None   Social Needs   • Financial resource strain: None   • Food insecurity:     Worry: None     Inability: None   • Transportation needs:     Medical: None     Non-medical: None   Tobacco Use   • Smoking status: Never Smoker   • Smokeless tobacco: Never Used   Substance and Sexual Activity   • Alcohol use: Yes     Comment: occas   • Drug use: No   • Sexual activity: Yes     Partners: Male   Lifestyle   • Physical activity:     Days per week: None     Minutes per session: None   • Stress: None   Relationships   • Social connections:     Talks on phone: None     Gets together: None     Attends Hindu service: None     Active member of club or organization: None     Attends meetings of clubs or organizations: None     Relationship status: None   • Intimate partner violence:     Fear of current or ex partner: None     Emotionally abused: None     Physically abused: None     Forced sexual activity: None   Other Topics Concern   • None   Social History Narrative   • None       Family History:   Family History   Problem Relation Age of Onset   • Pulmonary embolism Paternal Grandmother    • Diabetes Maternal Grandmother    • Colon cancer Biological Father    • Stroke Biological Mother    • Raynaud syndrome Biological Sister        Current Medications:   Current Outpatient Medications   Medication Sig Dispense Refill   • rivaroxaban (XARELTO) 10 mg tablet Take 1 tablet by mouth every morning. Pt will stop per MD instruction      • valsartan (DIOVAN) 80 mg tablet Take 80 mg by mouth once daily.       No current facility-administered medications for this visit.        Allergies: Codeine and Amoxicillin    Review of Systems  Constitutional: negative for weight loss  Gastrointestinal: negative for abdominal pain and incont  Genitourinary:negative for urinary incontinence  Reproductive:no vaginal bleeding  Integument/breast: negative for breast lump  "and breast tenderness  Musculoskeletal:negative  Neurological: negative  Behavioral/Psych: negative for anxiety and depression  Endocrine: negative    Physical Exam  Visit Vitals  /70 (BP Location: Left upper arm, Patient Position: Sitting)   Ht 1.651 m (5' 5\")   Wt 114 kg (252 lb)   BMI 41.93 kg/m²        General Appearance: Alert, cooperative, no acute distress  Head: Normocephalic, without obvious abnormality, atraumatic    Neck: no adenopathy  Nodes: no enlargement of axillary or supraclavicular nodes  Thyroid: no enlargement/tenderness/nodules  Lungs: Clear to auscultation bilaterally, respirations unlabored  Heart: Regular rate and rhythm, S1 and S2 normal, no murmur, rub or gallop  Breast: radiation changes and scars left, rt nl, no masses, nontender and no discharge  Abdomen: Soft, nontender, nondistended,   no masses, no organomegaly  Back: kyphosisNo   Pelvic:     Vulva normal, Bartholin's, Urethra, [place]'s normal  Vagina normal mucosa  Cervixmultiparous appearance  Uterusnormal size, anteverted, mobile, non-tender  Adnexa nonpalpable  Rectal: mass No   Extremities: wnl  Musculoskeletal:wnl  Skin: Skin color, texture, turgor normal, no rashes or lesions    Neurologic:oriented and  memory intact  Psych:normal affect and behavior appropriate    Assessment & Plan    Problem List Items Addressed This Visit     None      Visit Diagnoses     Encounter for gynecological examination    -  Primary    Osteoporosis screening        Relevant Orders    DEXA BONE DENSITY      exam wnl  Hx of breast ca, sees Dr Cowan.    Given [email or web address] as a resource for a new gyn    Return in about 1 year (around 2/18/2021).  Bailey Silver MD      "

## 2021-04-14 DIAGNOSIS — Z23 ENCOUNTER FOR IMMUNIZATION: ICD-10-CM

## (undated) DEVICE — DEVICE TRUCLEAR INCISOR 2.9

## (undated) DEVICE — TUBING HYSTEROSCOPIC TRUCLEAR

## (undated) DEVICE — DOLPHIN COLLECTION KIT

## (undated) DEVICE — PAD PERI MATERNITY LENGTH

## (undated) DEVICE — GOWN SURGICAL REINFORCED LARGE

## (undated) DEVICE — MANIFOLD SINGLE PORT NEPTUNE

## (undated) DEVICE — CANISTER SUCTION 3000CC BEMIS

## (undated) DEVICE — Device

## (undated) DEVICE — GLOVE SURG PROTEXIS NEOPRENE PF SZ 6

## (undated) DEVICE — SOLN IRRIG .9%SOD 1000ML